# Patient Record
Sex: FEMALE | Race: OTHER | URBAN - METROPOLITAN AREA
[De-identification: names, ages, dates, MRNs, and addresses within clinical notes are randomized per-mention and may not be internally consistent; named-entity substitution may affect disease eponyms.]

---

## 2019-05-07 ENCOUNTER — EMERGENCY (EMERGENCY)
Facility: HOSPITAL | Age: 34
LOS: 1 days | Discharge: HOME | End: 2019-05-07
Attending: EMERGENCY MEDICINE | Admitting: EMERGENCY MEDICINE
Payer: COMMERCIAL

## 2019-05-07 VITALS
OXYGEN SATURATION: 98 % | DIASTOLIC BLOOD PRESSURE: 81 MMHG | RESPIRATION RATE: 18 BRPM | SYSTOLIC BLOOD PRESSURE: 153 MMHG | TEMPERATURE: 98 F | HEART RATE: 85 BPM

## 2019-05-07 DIAGNOSIS — R07.9 CHEST PAIN, UNSPECIFIED: ICD-10-CM

## 2019-05-07 DIAGNOSIS — J45.901 UNSPECIFIED ASTHMA WITH (ACUTE) EXACERBATION: ICD-10-CM

## 2019-05-07 LAB — HCG UR QL: NEGATIVE — SIGNIFICANT CHANGE UP

## 2019-05-07 PROCEDURE — 99285 EMERGENCY DEPT VISIT HI MDM: CPT | Mod: 25

## 2019-05-07 PROCEDURE — 71046 X-RAY EXAM CHEST 2 VIEWS: CPT | Mod: 26

## 2019-05-07 RX ORDER — IPRATROPIUM/ALBUTEROL SULFATE 18-103MCG
3 AEROSOL WITH ADAPTER (GRAM) INHALATION ONCE
Qty: 0 | Refills: 0 | Status: COMPLETED | OUTPATIENT
Start: 2019-05-07 | End: 2019-05-07

## 2019-05-07 RX ADMIN — Medication 3 MILLILITER(S): at 17:11

## 2019-05-07 NOTE — ED ADULT NURSE NOTE - CHIEF COMPLAINT QUOTE
Patient to ED with complaint of chest pain and SOB X 2 days.  Patient appears well and in no acute distress Well exam scheduled.  Haleigh Silva, CMA

## 2019-05-07 NOTE — ED PROVIDER NOTE - OBJECTIVE STATEMENT
33F hx of mild asthma presenting with chest tightness. Hx of asthma flare during this month every year, hx of season allergy, presenting with chest tightness x 2 weeks, daily and constant and worse at night. Went to Urgent care, s/p EKG and sent the patient in for further eval. No FHX of heart dz, non smoker, no hx of clots, not on OCPs, not immobilization, no hx of recent sx. Pt endorsed to cough, sneezing at home, and worse at night. Denies any leg swelling, weight gain.

## 2019-05-07 NOTE — ED ADULT TRIAGE NOTE - CHIEF COMPLAINT QUOTE
Patient to ED with complaint of chest pain and SOB X 2 days.  Patient appears well and in no acute distress

## 2019-05-07 NOTE — ED PROVIDER NOTE - MDM ORDERS SUBMITTED SELECTION
From: Froylan Landis  To: CHRISTEN Schulz  Sent: 7/20/2017 3:11 PM CDT  Subject: Trazadone 90 Day Script?    Onesimo Willams,    I was let go from my job this week and may lose my insurance on August 1st. So that I may be covered for medication as long as possible I was hoping you could change the script for Trazadone from 30 to 90 days. If possible, could send this to the same pharmacy as before: [facility] [address] in [place]?    Thanks either way,    Todd Landis   [facility]

## 2019-05-07 NOTE — ED PROVIDER NOTE - NSFOLLOWUPINSTRUCTIONS_ED_ALL_ED_FT
- Use albuterol pump every 4-6 hour.  - See your Primary Care Doctor for a follow up.  - Return to the ED with any worsening of the symptoms. - Use albuterol pump every 4-6 hour.  -  steroid from your pharmacy.   - See your Primary Care Doctor for a follow up.  - Return to the ED with any worsening of the symptoms.

## 2020-01-31 NOTE — ED ADULT NURSE NOTE - OBJECTIVE STATEMENT
----- Message from Cesar Henning MD sent at 1/30/2020  6:50 PM EST -----  Regarding: MRI brain  Stable from 2018, call for problems, thanks.  ----- Message -----  From: Interface, Rad Results [facility] In  Sent: 1/30/2020   5:56 PM EST  To: Cesar Henning MD       pt here for asthma meds

## 2023-06-15 NOTE — ED PROVIDER NOTE - MUSCULOSKELETAL, MLM
Detail Level: Simple
Quality 137: Melanoma: Continuity Of Care - Recall System: Patient information entered into a recall system that includes: target date for the next exam specified AND a process to follow up with patients regarding missed or unscheduled appointments
When Should The Patient Follow-Up For Their Next Full-Body Skin Exam?: 3 Months
Detail Level: Detailed
Spine appears normal, range of motion is not limited, no muscle or joint tenderness